# Patient Record
Sex: FEMALE | URBAN - METROPOLITAN AREA
[De-identification: names, ages, dates, MRNs, and addresses within clinical notes are randomized per-mention and may not be internally consistent; named-entity substitution may affect disease eponyms.]

---

## 2017-05-05 ENCOUNTER — APPOINTMENT (OUTPATIENT)
Age: 27
Setting detail: DERMATOLOGY
End: 2017-05-05

## 2017-05-05 VITALS — HEIGHT: 68 IN | WEIGHT: 125 LBS

## 2017-05-05 DIAGNOSIS — L57.8 OTHER SKIN CHANGES DUE TO CHRONIC EXPOSURE TO NONIONIZING RADIATION: ICD-10-CM

## 2017-05-05 DIAGNOSIS — L70.0 ACNE VULGARIS: ICD-10-CM

## 2017-05-05 DIAGNOSIS — L81.0 POSTINFLAMMATORY HYPERPIGMENTATION: ICD-10-CM

## 2017-05-05 PROBLEM — J45.909 UNSPECIFIED ASTHMA, UNCOMPLICATED: Status: ACTIVE | Noted: 2017-05-05

## 2017-05-05 PROCEDURE — OTHER COUNSELING: OTHER

## 2017-05-05 PROCEDURE — 99202 OFFICE O/P NEW SF 15 MIN: CPT

## 2017-05-05 PROCEDURE — OTHER FOLLOW UP FOR NEXT VISIT: OTHER

## 2017-05-05 PROCEDURE — OTHER PRESCRIPTION: OTHER

## 2017-05-05 PROCEDURE — OTHER MIPS QUALITY: OTHER

## 2017-05-05 PROCEDURE — OTHER TREATMENT REGIMEN: OTHER

## 2017-05-05 RX ORDER — TRETINOIN 0.8 MG/G
THIN COAT GEL TOPICAL QHS
Qty: 1 | Refills: 2 | Status: ERX | COMMUNITY
Start: 2017-05-05

## 2017-05-05 RX ORDER — HYDROQUINONE 4 %
THIN COAT CREAM (GRAM) TOPICAL QHS
Qty: 1 | Refills: 2 | Status: ERX | COMMUNITY
Start: 2017-05-05

## 2017-05-05 ASSESSMENT — LOCATION SIMPLE DESCRIPTION DERM
LOCATION SIMPLE: LEFT LIP
LOCATION SIMPLE: LEFT CHEEK
LOCATION SIMPLE: INFERIOR FOREHEAD
LOCATION SIMPLE: RIGHT LIP
LOCATION SIMPLE: RIGHT CHEEK

## 2017-05-05 ASSESSMENT — LOCATION DETAILED DESCRIPTION DERM
LOCATION DETAILED: INFERIOR MID FOREHEAD
LOCATION DETAILED: LEFT LOWER CUTANEOUS LIP
LOCATION DETAILED: RIGHT LOWER CUTANEOUS LIP
LOCATION DETAILED: RIGHT INFERIOR CENTRAL MALAR CHEEK
LOCATION DETAILED: RIGHT MEDIAL BUCCAL CHEEK
LOCATION DETAILED: LEFT INFERIOR CENTRAL MALAR CHEEK

## 2017-05-05 ASSESSMENT — LOCATION ZONE DERM
LOCATION ZONE: LIP
LOCATION ZONE: FACE

## 2017-05-05 ASSESSMENT — SEVERITY ASSESSMENT: SEVERITY: MILD

## 2017-05-05 ASSESSMENT — SEVERITY ASSESSMENT OVERALL AMONG ALL PATIENTS
IN YOUR EXPERIENCE, AMONG ALL PATIENTS YOU HAVE SEEN WITH THIS CONDITION, HOW SEVERE IS THIS PATIENT'S CONDITION?: MULTIPLE INFLAMMATORY LESIONS BUT NONINFLAMMATORY LESIONS PREDOMINATE

## 2017-05-05 NOTE — HPI: PIMPLES (NODULAR ACNE)
How Severe Is Your Nodular Acne?: severe
Is This A New Presentation, Or A Follow-Up?: Acne
Females Only: When Was Your Last Menstrual Period?: 05/13/201

## 2017-05-05 NOTE — PROCEDURE: TREATMENT REGIMEN
Plan: Normal basic exam today, no suspicious lesions. I did recommend that the patient follow up at some point for an annual full body skin exam, she will consider and schedule at a later date
Detail Level: Zone
Detail Level: Simple
Samples Given: Aczone 7.5% QAM. Coupon given, patient will call for prescription if this is working
Plan: Long discussion with patient regarding various treatment options. It does sound like this might have a hormonal component, I will do my best to research different birth control option that we could change her to. We may also consider starting spironolactone if she is not responding to the topical medications. Continue Retin-A micro, added samples of Aczone 7.5% in the morning, patient will call for a prescription if this is working. Follow up in 6 to 8 weeks to check progress. We did also briefly discussed the possibility of Accutane considering her resistant acne, and that was given and patient will consider this as a possible treatment option. Of note she is interested in avoiding oral antibiotics if possible
Continue Regimen: Retin A micro 0.08% QHS
Plan: The Retin-A will likely help as well, however the fade cream should work as intended. Patient was warned regarding the possible side effect of increased discoloration, or lightening. That is why I would like her to use the medication as directed two weeks on two weeks off per month

## 2017-05-05 NOTE — PROCEDURE: MIPS QUALITY
Quality 47: Advance Care Plan: Advance Care Planning discussed and documented in the medical record; patient did not wish or was not able to name a surrogate decision maker or provide an advance care plan.
Quality 110: Preventive Care And Screening: Influenza Immunization: Influenza Immunization Ordered or Recommended, but not Administered
Detail Level: Detailed
Quality 111:Pneumonia Vaccination Status For Older Adults: Pneumococcal Vaccination not Administered or Previously Received, Reason not Otherwise Specified